# Patient Record
Sex: MALE | Race: WHITE
[De-identification: names, ages, dates, MRNs, and addresses within clinical notes are randomized per-mention and may not be internally consistent; named-entity substitution may affect disease eponyms.]

---

## 2019-03-28 PROBLEM — Z00.00 ENCOUNTER FOR PREVENTIVE HEALTH EXAMINATION: Status: ACTIVE | Noted: 2019-03-28

## 2019-04-03 ENCOUNTER — APPOINTMENT (OUTPATIENT)
Dept: ORTHOPEDIC SURGERY | Facility: CLINIC | Age: 69
End: 2019-04-03
Payer: COMMERCIAL

## 2019-04-03 VITALS — HEIGHT: 67 IN | BODY MASS INDEX: 29.82 KG/M2 | WEIGHT: 190 LBS

## 2019-04-03 PROCEDURE — 99213 OFFICE O/P EST LOW 20 MIN: CPT

## 2019-04-03 PROCEDURE — 73562 X-RAY EXAM OF KNEE 3: CPT | Mod: LT

## 2019-04-03 NOTE — HISTORY OF PRESENT ILLNESS
[5] : a current pain level of 5/10 [de-identified] : left knee pain \par Atraumatic\par Symptoms began in January 2019\par Pain level 4-5/10\par Aching\par Aggravating factors: Standing,weight bearing, going from sitting to standing , stairs \par Associated factors: Radiating down leg, stiffness\par Alleviating factors; Rest\par

## 2019-04-03 NOTE — PHYSICAL EXAM
[de-identified] : Left knee examination shows mild swelling there is tenderness along the joint line. There is a negative Bobbi test there is crepitus on range of motion. There is no instability. Neurovascular exam is normal. [de-identified] : Left knee x-ray shows mild to moderate degenerative arthritis.

## 2019-04-03 NOTE — DISCUSSION/SUMMARY
[de-identified] : The patient will ice. He will use over-the-counter medications as needed. He will use his brace. He can stretch. He will do his home exercises. He may consider a cortisone injection. Followup will be as needed.

## 2019-07-11 ENCOUNTER — APPOINTMENT (OUTPATIENT)
Dept: ORTHOPEDIC SURGERY | Facility: CLINIC | Age: 69
End: 2019-07-11
Payer: COMMERCIAL

## 2019-07-11 DIAGNOSIS — M17.12 UNILATERAL PRIMARY OSTEOARTHRITIS, LEFT KNEE: ICD-10-CM

## 2019-07-11 PROCEDURE — 99213 OFFICE O/P EST LOW 20 MIN: CPT | Mod: 25

## 2019-07-11 PROCEDURE — 20610 DRAIN/INJ JOINT/BURSA W/O US: CPT | Mod: LT

## 2019-07-11 NOTE — PHYSICAL EXAM
[de-identified] : Shows no warmth or swelling. There is patellofemoral tenderness there is joint line pain medially negative Bobbi neurovascular exam is normal there is crepitus

## 2019-07-11 NOTE — DISCUSSION/SUMMARY
[de-identified] : Left knee injection given with cortisone. If the pain doesn't improve he'll let me know. Followup will be as needed

## 2019-07-11 NOTE — HISTORY OF PRESENT ILLNESS
[de-identified] : This patient is here for followup of his left knee. He is still having significant pain from the arthritis. He does use a brace. He tried to be careful taking over-the-counter medications because of his GI problems per

## 2020-12-29 ENCOUNTER — APPOINTMENT (OUTPATIENT)
Dept: UROLOGY | Facility: CLINIC | Age: 70
End: 2020-12-29
Payer: COMMERCIAL

## 2020-12-29 DIAGNOSIS — Z12.5 ENCOUNTER FOR SCREENING FOR MALIGNANT NEOPLASM OF PROSTATE: ICD-10-CM

## 2020-12-29 PROCEDURE — 99204 OFFICE O/P NEW MOD 45 MIN: CPT

## 2020-12-29 PROCEDURE — 99072 ADDL SUPL MATRL&STAF TM PHE: CPT

## 2020-12-29 NOTE — ASSESSMENT
[FreeTextEntry1] : 69 yo M with a PMHx of PSA screening and asymptomatic nephrolithiasis\par \par - Plan for PSA today, ANURAG normal\par - Offered litholink and US renal - would like to check with insurance

## 2020-12-29 NOTE — HISTORY OF PRESENT ILLNESS
[FreeTextEntry1] : HPI: 69 yo M here today for regular prostate exam. His symptoms are minimal and only notable for nocturia x2, no sensation of incomplete emptying. No symptoms, occasionally complains of R sided testicular soreness, but no pain today. Has a history of "gravel" in kidneys and asymptomatically passing stone fragments. Had US Renal within a year and no evidence of stone per patient - not interested in US renal today.\par \par Anticoagulation: None\par All: penicillin\par Social: father at [employer]\par PMHx:negative for HTN, HLD, DM\par FHx: no cancer in family\par PSHx: negative for surgeries\par Labs: normal PSA with Dr. Armstrong per patient\par  [Urinary Incontinence] : no urinary incontinence [Urinary Retention] : no urinary retention [Urinary Urgency] : no urinary urgency [Urinary Frequency] : no urinary frequency

## 2020-12-29 NOTE — PHYSICAL EXAM
[General Appearance - Well Developed] : well developed [General Appearance - Well Nourished] : well nourished [Heart Rate And Rhythm] : Heart rate and rhythm were normal [] : no respiratory distress [Respiration, Rhythm And Depth] : normal respiratory rhythm and effort [Bowel Sounds] : normal bowel sounds [Abdomen Soft] : soft [Epididymis] : the epididymides were normal [Testes Mass (___cm)] : there were no testicular masses [Prostate Enlargement] : the prostate was not enlarged [Prostate Tenderness] : the prostate was not tender [No Prostate Nodules] : no prostate nodules [Normal Station and Gait] : the gait and station were normal for the patient's age [Skin Color & Pigmentation] : normal skin color and pigmentation [Skin Turgor] : supple [No Focal Deficits] : no focal deficits [Oriented To Time, Place, And Person] : oriented to person, place, and time [Not Anxious] : not anxious [No Palpable Adenopathy] : no palpable adenopathy

## 2020-12-30 LAB
PSA FREE FLD-MCNC: 20 %
PSA FREE SERPL-MCNC: 0.22 NG/ML
PSA SERPL-MCNC: 1.09 NG/ML

## 2021-01-04 ENCOUNTER — APPOINTMENT (OUTPATIENT)
Dept: ORTHOPEDIC SURGERY | Facility: CLINIC | Age: 71
End: 2021-01-04
Payer: COMMERCIAL

## 2021-01-04 VITALS — WEIGHT: 190 LBS | BODY MASS INDEX: 29.82 KG/M2 | HEIGHT: 67 IN

## 2021-01-04 DIAGNOSIS — M53.1 CERVICOBRACHIAL SYNDROME: ICD-10-CM

## 2021-01-04 PROCEDURE — 99214 OFFICE O/P EST MOD 30 MIN: CPT

## 2021-01-04 PROCEDURE — 99072 ADDL SUPL MATRL&STAF TM PHE: CPT

## 2021-01-04 PROCEDURE — 73030 X-RAY EXAM OF SHOULDER: CPT | Mod: RT

## 2021-01-04 PROCEDURE — 72040 X-RAY EXAM NECK SPINE 2-3 VW: CPT

## 2021-01-04 NOTE — PHYSICAL EXAM
[UE] : Sensory: Intact in bilateral upper extremities [Normal RUE] : Right Upper Extremity: No scars, rashes, lesions, ulcers, skin intact [Normal LUE] : Left Upper Extremity: No scars, rashes, lesions, ulcers, skin intact [Normal Touch] : sensation intact for touch [Normal] : No swelling, no edema, normal pedal pulses and normal temperature [de-identified] : Neck exam shows tenderness to palpation along her right trapezius as well as paraspinal musculature as well as into right scapula. Negative Spurling sign. His sensory exam is normal motor strength is normal reflexes are normal. [de-identified] : X-ray of the cervical spine shows evidence of degenerative arthritis.

## 2021-01-04 NOTE — HISTORY OF PRESENT ILLNESS
[de-identified] : Location: neck & scapula \par Quality:  aching\par Duration: 3 weeks\par Context: atraumatic\par Aggravating Factors: carrying heavy back pack , morning \par Conservative treatment:  otc medication\par Associated Symptoms: stiffness\par Prior Studies: n.a \par

## 2021-01-04 NOTE — DISCUSSION/SUMMARY
[de-identified] : He will use heat he will stretch she will give it time and check his mattress. Follow up will be as needed

## 2021-01-20 ENCOUNTER — NON-APPOINTMENT (OUTPATIENT)
Age: 71
End: 2021-01-20

## 2022-06-08 ENCOUNTER — APPOINTMENT (OUTPATIENT)
Dept: ORTHOPEDIC SURGERY | Facility: CLINIC | Age: 72
End: 2022-06-08
Payer: COMMERCIAL

## 2022-06-08 VITALS — WEIGHT: 180 LBS | BODY MASS INDEX: 28.25 KG/M2 | HEIGHT: 67 IN

## 2022-06-08 DIAGNOSIS — M17.11 UNILATERAL PRIMARY OSTEOARTHRITIS, RIGHT KNEE: ICD-10-CM

## 2022-06-08 DIAGNOSIS — M25.571 PAIN IN RIGHT ANKLE AND JOINTS OF RIGHT FOOT: ICD-10-CM

## 2022-06-08 PROCEDURE — 99213 OFFICE O/P EST LOW 20 MIN: CPT

## 2022-06-08 NOTE — DISCUSSION/SUMMARY
[de-identified] : He is using appropriate shoes and hasn't insert. He will do stretching and rolling of the bottom of his foot. I told him to take some Tylenol. If the symptoms worsen he'll let me know followup will be as needed I gave him the plantar fascia handout

## 2022-06-08 NOTE — REASON FOR VISIT
[Follow-Up Visit] : a follow-up visit for [FreeTextEntry2] : right foot and ankle and knee pain during walking for 3 weeks. Doesn't waking up pain at night

## 2022-06-08 NOTE — PHYSICAL EXAM
[de-identified] : Right hip, knee and foot shows no obvious warmth or swelling. He does have some callus formation underneath the bottom part of his foot especially the 50th very tight plantar fascia that's uncomfortable he is a full range of motion of his ankle and knee and hip. In his neurovascular exam is normal

## 2022-06-08 NOTE — HISTORY OF PRESENT ILLNESS
[de-identified] : He is here for more preventative than any particular issue he has some intermittent pain in his knee and right hip and pain in the bottom part of his foot as well as to both the inner and outer part of his ankle. Comments randomly but somewhat with walking he is pretty good about stretching. He does have plantar fascial problems no numbness or tingling or back pain